# Patient Record
Sex: FEMALE
[De-identification: names, ages, dates, MRNs, and addresses within clinical notes are randomized per-mention and may not be internally consistent; named-entity substitution may affect disease eponyms.]

---

## 2019-10-02 ENCOUNTER — HOSPITAL ENCOUNTER (EMERGENCY)
Dept: HOSPITAL 5 - ED | Age: 44
LOS: 1 days | Discharge: HOME | End: 2019-10-03
Payer: SELF-PAY

## 2019-10-02 DIAGNOSIS — D25.9: ICD-10-CM

## 2019-10-02 DIAGNOSIS — K80.20: Primary | ICD-10-CM

## 2019-10-02 LAB
ALBUMIN SERPL-MCNC: 4.9 G/DL (ref 3.9–5)
ALT SERPL-CCNC: 14 UNITS/L (ref 7–56)
APTT BLD: 26.1 SEC. (ref 24.2–36.6)
BASOPHILS # (AUTO): 0.1 K/MM3 (ref 0–0.1)
BASOPHILS NFR BLD AUTO: 1.5 % (ref 0–1.8)
BILIRUB UR QL STRIP: (no result)
BLOOD UR QL VISUAL: (no result)
BUN SERPL-MCNC: 17 MG/DL (ref 7–17)
BUN/CREAT SERPL: 34 %
CALCIUM SERPL-MCNC: 8.8 MG/DL (ref 8.4–10.2)
EOSINOPHIL # BLD AUTO: 0.1 K/MM3 (ref 0–0.4)
EOSINOPHIL NFR BLD AUTO: 1.3 % (ref 0–4.3)
HCT VFR BLD CALC: 27.6 % (ref 30.3–42.9)
HEMOLYSIS INDEX: 3
HGB BLD-MCNC: 7.9 GM/DL (ref 10.1–14.3)
INR PPP: 1.03 (ref 0.87–1.13)
LYMPHOCYTES # BLD AUTO: 2.5 K/MM3 (ref 1.2–5.4)
LYMPHOCYTES NFR BLD AUTO: 32.1 % (ref 13.4–35)
MCHC RBC AUTO-ENTMCNC: 29 % (ref 30–34)
MCV RBC AUTO: 55 FL (ref 79–97)
MONOCYTES # (AUTO): 0.6 K/MM3 (ref 0–0.8)
MONOCYTES % (AUTO): 7.6 % (ref 0–7.3)
MUCOUS THREADS #/AREA URNS HPF: (no result) /HPF
PH UR STRIP: 5 [PH] (ref 5–7)
PLATELET # BLD: 324 K/MM3 (ref 140–440)
RBC # BLD AUTO: 4.99 M/MM3 (ref 3.65–5.03)
RBC #/AREA URNS HPF: 3 /HPF (ref 0–6)
UROBILINOGEN UR-MCNC: < 2 MG/DL (ref ?–2)
WBC #/AREA URNS HPF: 3 /HPF (ref 0–6)

## 2019-10-02 PROCEDURE — 96361 HYDRATE IV INFUSION ADD-ON: CPT

## 2019-10-02 PROCEDURE — 85610 PROTHROMBIN TIME: CPT

## 2019-10-02 PROCEDURE — 96375 TX/PRO/DX INJ NEW DRUG ADDON: CPT

## 2019-10-02 PROCEDURE — 93005 ELECTROCARDIOGRAM TRACING: CPT

## 2019-10-02 PROCEDURE — 80053 COMPREHEN METABOLIC PANEL: CPT

## 2019-10-02 PROCEDURE — 71046 X-RAY EXAM CHEST 2 VIEWS: CPT

## 2019-10-02 PROCEDURE — 84484 ASSAY OF TROPONIN QUANT: CPT

## 2019-10-02 PROCEDURE — 99284 EMERGENCY DEPT VISIT MOD MDM: CPT

## 2019-10-02 PROCEDURE — 82150 ASSAY OF AMYLASE: CPT

## 2019-10-02 PROCEDURE — 36415 COLL VENOUS BLD VENIPUNCTURE: CPT

## 2019-10-02 PROCEDURE — 85025 COMPLETE CBC W/AUTO DIFF WBC: CPT

## 2019-10-02 PROCEDURE — 85730 THROMBOPLASTIN TIME PARTIAL: CPT

## 2019-10-02 PROCEDURE — 81001 URINALYSIS AUTO W/SCOPE: CPT

## 2019-10-02 PROCEDURE — 93010 ELECTROCARDIOGRAM REPORT: CPT

## 2019-10-02 PROCEDURE — 96374 THER/PROPH/DIAG INJ IV PUSH: CPT

## 2019-10-02 PROCEDURE — 74177 CT ABD & PELVIS W/CONTRAST: CPT

## 2019-10-02 PROCEDURE — 84703 CHORIONIC GONADOTROPIN ASSAY: CPT

## 2019-10-02 PROCEDURE — 83690 ASSAY OF LIPASE: CPT

## 2019-10-02 NOTE — EMERGENCY DEPARTMENT REPORT
ED Abdominal Pain HPI





- General


Chief Complaint: Abdominal Pain


Stated Complaint: UPPER ABDOMINAL PAIN


Time Seen by Provider: 10/02/19 19:40


Source: patient


Mode of arrival: Ambulatory


Limitations: No Limitations





- History of Present Illness


Initial Comments: 





This is a 44-year-old female nontoxic, well nourished in appearance, no acute 

signs of distress presents to the ED with c/o of nausea and vomiting and 

abdominal pain 1 day.   Patient describes vomiting as food content and yellow 

gastric acid.  Patient describes abdominal pain as cramping and aching with 

level of 10/10 in the epigastric area.  Patient denies chest pain, short of 

breath, fever, chills, headache, stiff neck, numbness or tingling.  Patient 

denies any diarrhea or constipation.  Patient denies any recent travels.  

Patient denies any allergies or PMH.


MD Complaint: abdominal pain


-: This afternoon


Location: epigastric


Radiation: none


Migration to: no migration


Severity: moderate


Severity scale (0 -10): 10


Quality: cramping, aching


Consistency: constant


Improves With: nothing


Worsens With: nothing


Associated Symptoms: nausea, vomiting.  denies: diarrhea, fever, chills, con

stipation, dysuria, hematemesis, hematochezia, melena, hematuria, anorexia, 

syncope





- Related Data


                                  Previous Rx's











 Medication  Instructions  Recorded  Last Taken  Type


 


Acetaminophen/Codeine [Tylenol 1 tab PO Q6H PRN #12 tab 10/03/19 Unknown Rx





/Codeine # 3 tab]    


 


Ondansetron [Zofran Odt] 4 mg PO Q8HR PRN #20 tab.rapdis 10/03/19 Unknown Rx











                                    Allergies











Allergy/AdvReac Type Severity Reaction Status Date / Time


 


No Known Allergies Allergy   Unverified 10/02/19 19:41














ED Review of Systems


ROS: 


Stated complaint: UPPER ABDOMINAL PAIN


Other details as noted in HPI





Constitutional: denies: chills, fever


Eyes: denies: eye pain, eye discharge, vision change


ENT: denies: ear pain, throat pain


Respiratory: denies: cough, shortness of breath, wheezing


Cardiovascular: denies: chest pain, palpitations


Endocrine: no symptoms reported


Gastrointestinal: abdominal pain, nausea, vomiting.  denies: diarrhea


Genitourinary: denies: urgency, dysuria, discharge


Musculoskeletal: denies: back pain, joint swelling, arthralgia


Skin: denies: rash, lesions


Neurological: denies: headache, weakness, paresthesias


Psychiatric: denies: anxiety, depression


Hematological/Lymphatic: denies: easy bleeding, easy bruising





ED Past Medical Hx





- Past Medical History


Previous Medical History?: Yes





- Surgical History


Past Surgical History?: No





- Social History


Smoking Status: Never Smoker


Substance Use Type: None





- Medications


Home Medications: 


                                Home Medications











 Medication  Instructions  Recorded  Confirmed  Last Taken  Type


 


Acetaminophen/Codeine [Tylenol 1 tab PO Q6H PRN #12 tab 10/03/19  Unknown Rx





/Codeine # 3 tab]     


 


Ondansetron [Zofran Odt] 4 mg PO Q8HR PRN #20 tab.rapdis 10/03/19  Unknown Rx














ED Physical Exam





- General


Limitations: No Limitations


General appearance: alert, in no apparent distress





- Head


Head exam: Present: atraumatic, normocephalic





- Neck


Neck exam: Present: normal inspection, full ROM.  Absent: tenderness, 

meningismus, lymphadenopathy





- Respiratory


Respiratory exam: Present: normal lung sounds bilaterally.  Absent: respiratory 

distress, wheezes





- Cardiovascular


Cardiovascular Exam: Present: regular rate, normal rhythm





- GI/Abdominal


GI/Abdominal exam: Present: soft, tenderness (epigastric), normal bowel sounds. 

Absent: distended, guarding, rebound, rigid, diminished bowel sounds





- Extremities Exam


Extremities exam: Present: normal inspection, full ROM





- Back Exam


Back exam: Present: normal inspection, full ROM.  Absent: tenderness, CVA 

tenderness (R), CVA tenderness (L), muscle spasm, paraspinal tenderness, 

vertebral tenderness, rash noted





- Neurological Exam


Neurological exam: Present: alert, oriented X3, normal gait





- Psychiatric


Psychiatric exam: Present: normal affect, normal mood





- Skin


Skin exam: Present: warm, dry, intact, normal color.  Absent: rash





- Other


Other exam information: 





Ingrid MONAHAN present during physical exam and interview for Syriac translation.





ED Course


                                   Vital Signs











  10/02/19 10/02/19 10/02/19





  19:33 20:22 20:30


 


Temperature 97.9 F  


 


Pulse Rate 111 H  


 


Respiratory 24  





Rate   


 


Blood Pressure 137/69  127/53


 


O2 Sat by Pulse 100 100 100





Oximetry   














  10/02/19 10/02/19 10/02/19





  20:45 21:00 21:16


 


Temperature   


 


Pulse Rate   


 


Respiratory   





Rate   


 


Blood Pressure 122/51 121/62 102/65


 


O2 Sat by Pulse 98 99 100





Oximetry   














  10/02/19 10/02/19 10/02/19





  21:30 21:46 22:00


 


Temperature   


 


Pulse Rate   


 


Respiratory   





Rate   


 


Blood Pressure 112/76 108/45 112/59


 


O2 Sat by Pulse 100 96 99





Oximetry   














  10/02/19 10/02/19





  22:15 22:30


 


Temperature  


 


Pulse Rate  


 


Respiratory  





Rate  


 


Blood Pressure 111/53 97/57


 


O2 Sat by Pulse 98 100





Oximetry  














- Reevaluation(s)


Reevaluation #1: 





10/02/19 23:07


Patient is speaking in full sentences with no signs of distress noted.





ED Medical Decision Making





- Lab Data


Result diagrams: 


                                 10/02/19 20:01





                                 10/02/19 20:01





- Medical Decision Making





This is a 44-year-old female that presents with uterine fibroids, nausea 

vomiting and gallstone.  Patient is stable and was examined by me. There is no 

abdominal tenderness. Negative signs of symptoms of appendicitis.  Labs 

obtained. UA obtained. CT of abdomen obtained and dictated by the radiologist. 

Patient is notified of the report with no questions noted by the patient. Vital 

signs are stable prior to discharge.  Patient received medical treatment in the 

ED which patient stated symptoms has resovled and subsided.  Was instructed note

to operate any machinery due to possible drowsiness and stated someone will 

drive the patient home. A by mouth challenge has been obtained and patient 

tolerated well with no nausea vomiting. Patient was also instructed to Follow-up

with a primary care doctor in 3-5 days or if symptoms worsen and continue return

to emergency room as soon as possible.  At time of discharge, the patient does 

not seem toxic or ill in appearance.  No acute signs of distress noted.  Patient

agrees to discharge treatment plan of care.  No further questions noted by the 

patient.


Critical care attestation.: 


If time is entered above; I have spent that time in minutes in the direct care 

of this critically ill patient, excluding procedure time.








ED Disposition


Clinical Impression: 


Abdominal pain


Qualifiers:


 Abdominal location: epigastric Qualified Code(s): R10.13 - Epigastric pain





Nausea & vomiting


Qualifiers:


 Vomiting type: unspecified Vomiting Intractability: non-intractable Qualified 

Code(s): R11.2 - Nausea with vomiting, unspecified





Gallstone


Qualifiers:


 Cholecystitis presence: without cholecystitis Biliary obstruction: without 

biliary obstruction Qualified Code(s): K80.20 - Calculus of gallbladder without 

cholecystitis without obstruction





Uterine fibroid


Qualifiers:


 Uterine leiomyoma location: unspecified location Qualified Code(s): D25.9 - 

Leiomyoma of uterus, unspecified





Disposition: DC-01 TO HOME OR SELFCARE


Is pt being admited?: No


Does the pt Need Aspirin: No


Condition: Stable


Instructions:  Abdominal Pain (ED), Acute Nausea and Vomiting (ED), Biliary 

Colic (ED)


Additional Instructions: 


Follow-up with a primary care and general surgery doctor in 3-5 days or if 

symptoms worsen and continue return to emergency room as soon as possible. 


Prescriptions: 


Acetaminophen/Codeine [Tylenol /Codeine # 3 tab] 1 tab PO Q6H PRN #12 tab


 PRN Reason: Pain , Severe (7-10)


Ondansetron [Zofran Odt] 4 mg PO Q8HR PRN #20 tab.rapdis


 PRN Reason: Nausea


Referrals: 


PRIMARY CARE,MD [Referring] - 3-5 Days


SWAPNIL MATIAS DO [Staff Physician] - 3-5 Days


MIESHA CAMARENA MD [Staff Physician] - 3-5 Days


[facility] [Outside] - 3-5 Days


[facility] [Outside] - 3-5 Days


Forms:  Work/School Release Form(ED)


Print Language: Dutch

## 2019-10-02 NOTE — CAT SCAN REPORT
CT ABDOMEN AND PELVIS WITH IV CONTRAST, 10/2/2019



INDICATION: Acute generalized abdominal pain 



TECHNIQUE: Following the administration of intravenous contrast, multiple axial CT images of the abdo
men and pelvis were acquired. Sagittal and coronal reformats were obtained.  All CT performed at this
 facility utilize dose reduction techniques including automated exposure control, iterative reconstru
ction and weight based dosing when appropriate to reduce patient radiation dose to as low as reasonab
ly achievable. .



COMPARISON: None



FINDINGS:

Limited imaging of the bilateral lung bases demonstrates no evidence of acute abnormality.



Abdomen: There is a large peripherally calcified stone within the gallbladder measuring 3 cm. The chris
er, spleen, pancreas, bilateral adrenal glands and bilateral kidneys show no evidence of acute abnorm
ality. The small and large bowel are normal in caliber. There is no evidence of bowel obstruction. Th
e appendix is visualized and appears normal.



Pelvis: The uterus is diffusely enlarged and heterogeneous, measuring approximately 10.5 axial 8.1 cm
. There is a suspected trace amount of free pelvic fluid. There is a peripherally enhancing 2 cm left
 adnexal cyst. The urinary bladder appears normal.



Evaluation of bony structures demonstrates no evidence of acute bony abnormality. Evaluation of soft 
tissue structures demonstrates no evidence of acute soft tissue abnormality.



IMPRESSION:

1. Enhancing left adnexal cyst that may represent a hemorrhagic cyst. Please correlate with patient's
 clinical circumstances.

2. Enlarged heterogeneous uterus which may be related to the presence of fibroids. Correlation with a
ny previous imaging would be helpful to confirm this.

3. Single large calcified gallstone.



Signer Name: Machelle Boo MD 

Signed: 10/2/2019 11:16 PM

 Workstation Name: [id number]

## 2019-10-02 NOTE — EVENT NOTE
ED Screening Note


Date of service: 10/02/19


Time: 19:40


ED Screening Note: 


 44 y o fe,howie presents with mid abdominal pain radiating upwards casing sob  

and dizziness


states sx started today 1 hour ago


This initial assessment/diagnostic orders/clinical plan/treatment(s) is/are 

subject to change based on patients health status, clinical progression and re-

assessment by fellow clinical providers in the ED. Further treatment and workup 

at subsequent clinical providers discretion. Patient/guardian urged not to elope

from the ED as their condition may be serious if not clinically assessed and 

managed. 





Initial orders include: 


labs


ekg


ct abd


main side eval

## 2019-10-03 VITALS — DIASTOLIC BLOOD PRESSURE: 54 MMHG | SYSTOLIC BLOOD PRESSURE: 120 MMHG
